# Patient Record
Sex: MALE | Race: WHITE | HISPANIC OR LATINO
[De-identification: names, ages, dates, MRNs, and addresses within clinical notes are randomized per-mention and may not be internally consistent; named-entity substitution may affect disease eponyms.]

---
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
## 2022-02-28 ENCOUNTER — TELEPHONE (OUTPATIENT)
Dept: SCHEDULING | Age: 22
End: 2022-02-28

## 2022-03-03 PROBLEM — R45.4 IRRITABILITY AND ANGER: Status: ACTIVE | Noted: 2022-03-03

## 2022-03-03 PROBLEM — Z78.9 NO KNOWN HEALTH PROBLEMS: Status: ACTIVE | Noted: 2022-03-03

## 2022-03-03 PROBLEM — Z71.82 EXERCISE COUNSELING: Status: ACTIVE | Noted: 2022-03-03

## 2022-03-03 PROBLEM — Z71.3 DIETARY COUNSELING AND SURVEILLANCE: Status: ACTIVE | Noted: 2022-03-03

## 2022-03-03 PROBLEM — R10.13 EPIGASTRIC PAIN: Status: ACTIVE | Noted: 2022-03-03

## 2022-03-03 PROBLEM — K29.00 ACUTE GASTRITIS WITHOUT HEMORRHAGE: Status: ACTIVE | Noted: 2022-03-03

## 2022-03-03 PROBLEM — Z00.01 ENCOUNTER FOR GENERAL ADULT MEDICAL EXAMINATION WITH ABNORMAL FINDINGS: Status: ACTIVE | Noted: 2022-03-03

## 2023-04-18 PROBLEM — B08.1 MOLLUSCUM CONTAGIOSUM INFECTION: Status: ACTIVE | Noted: 2023-04-18

## 2023-04-18 PROBLEM — K29.00 ACUTE GASTRITIS WITHOUT HEMORRHAGE: Status: RESOLVED | Noted: 2022-03-03 | Resolved: 2023-04-18

## 2023-04-18 PROBLEM — Z11.3 SCREEN FOR STD (SEXUALLY TRANSMITTED DISEASE): Status: ACTIVE | Noted: 2023-04-18

## 2023-04-18 PROBLEM — R10.13 EPIGASTRIC PAIN: Status: RESOLVED | Noted: 2022-03-03 | Resolved: 2023-04-18

## 2023-04-18 PROBLEM — R45.4 OUTBURSTS OF ANGER: Status: ACTIVE | Noted: 2023-04-18

## 2023-04-27 ENCOUNTER — APPOINTMENT (OUTPATIENT)
Dept: URBAN - METROPOLITAN AREA CLINIC 313 | Age: 23
Setting detail: DERMATOLOGY
End: 2023-05-09

## 2023-04-27 DIAGNOSIS — B08.1 MOLLUSCUM CONTAGIOSUM: ICD-10-CM

## 2023-04-27 PROCEDURE — OTHER MIPS QUALITY: OTHER

## 2023-04-27 PROCEDURE — OTHER COUNSELING: OTHER

## 2023-04-27 PROCEDURE — OTHER LIQUID NITROGEN: OTHER

## 2023-04-27 PROCEDURE — 17110 DESTRUCT B9 LESION 1-14: CPT

## 2023-04-27 ASSESSMENT — LOCATION DETAILED DESCRIPTION DERM
LOCATION DETAILED: SUPRAPUBIC SKIN
LOCATION DETAILED: RIGHT DORSAL SHAFT OF PENIS
LOCATION DETAILED: LEFT DORSAL SHAFT OF PENIS

## 2023-04-27 ASSESSMENT — LOCATION ZONE DERM
LOCATION ZONE: TRUNK
LOCATION ZONE: PENIS

## 2023-04-27 ASSESSMENT — LOCATION SIMPLE DESCRIPTION DERM
LOCATION SIMPLE: GROIN
LOCATION SIMPLE: PENIS

## 2023-04-27 NOTE — PROCEDURE: MIPS QUALITY
Detail Level: Generalized
Quality 431: Preventive Care And Screening: Unhealthy Alcohol Use - Screening: Patient not identified as an unhealthy alcohol user when screened for unhealthy alcohol use using a systematic screening method
Quality 130: Documentation Of Current Medications In The Medical Record: Current Medications Documented
Quality 226: Preventive Care And Screening: Tobacco Use: Screening And Cessation Intervention: Patient screened for tobacco use and is an ex/non-smoker
Quality 402: Tobacco Use And Help With Quitting Among Adolescents: Patient screened for tobacco and never smoked
Quality 110: Preventive Care And Screening: Influenza Immunization: Influenza Immunization Administered during Influenza season

## 2023-04-27 NOTE — PROCEDURE: LIQUID NITROGEN
Show Topical Anesthesia Variable?: Yes
Medical Necessity Clause: This procedure was medically necessary because the lesions that were treated were:
Consent: The patient's consent was obtained including but not limited to risks of crusting, scabbing, blistering, scarring, darker or lighter pigmentary change, recurrence, incomplete removal and infection.
Spray Paint Technique: No
Medical Necessity Information: It is in your best interest to select a reason for this procedure from the list below. All of these items fulfill various CMS LCD requirements except the new and changing color options.
Post-Care Instructions: I reviewed with the patient in detail post-care instructions. Patient is to wear sunprotection, and avoid picking at any of the treated lesions. Pt may apply Vaseline to crusted or scabbing areas.
Detail Level: Detailed
Spray Paint Text: The liquid nitrogen was applied to the skin utilizing a spray paint frosting technique.